# Patient Record
Sex: FEMALE | Race: WHITE | NOT HISPANIC OR LATINO | URBAN - METROPOLITAN AREA
[De-identification: names, ages, dates, MRNs, and addresses within clinical notes are randomized per-mention and may not be internally consistent; named-entity substitution may affect disease eponyms.]

---

## 2018-07-20 PROBLEM — Z00.00 ENCOUNTER FOR PREVENTIVE HEALTH EXAMINATION: Status: ACTIVE | Noted: 2018-07-20

## 2019-07-10 ENCOUNTER — INPATIENT (INPATIENT)
Facility: HOSPITAL | Age: 84
LOS: 1 days | Discharge: HOME | DRG: 537 | End: 2019-07-12
Attending: INTERNAL MEDICINE | Admitting: INTERNAL MEDICINE
Payer: MEDICARE

## 2019-07-10 VITALS
RESPIRATION RATE: 16 BRPM | DIASTOLIC BLOOD PRESSURE: 100 MMHG | TEMPERATURE: 98 F | SYSTOLIC BLOOD PRESSURE: 166 MMHG | WEIGHT: 110.01 LBS | HEART RATE: 87 BPM | OXYGEN SATURATION: 96 % | HEIGHT: 60 IN

## 2019-07-10 DIAGNOSIS — R26.81 UNSTEADINESS ON FEET: ICD-10-CM

## 2019-07-10 LAB
ALBUMIN SERPL ELPH-MCNC: 4.3 G/DL — SIGNIFICANT CHANGE UP (ref 3.3–5)
ALP SERPL-CCNC: 69 U/L — SIGNIFICANT CHANGE UP (ref 40–120)
ALT FLD-CCNC: 18 U/L — SIGNIFICANT CHANGE UP (ref 10–45)
ANION GAP SERPL CALC-SCNC: 14 MMOL/L — SIGNIFICANT CHANGE UP (ref 5–17)
APTT BLD: 36.9 SEC — HIGH (ref 27.5–36.3)
AST SERPL-CCNC: 24 U/L — SIGNIFICANT CHANGE UP (ref 10–40)
BILIRUB SERPL-MCNC: 0.4 MG/DL — SIGNIFICANT CHANGE UP (ref 0.2–1.2)
BUN SERPL-MCNC: 31 MG/DL — HIGH (ref 7–23)
CALCIUM SERPL-MCNC: 9.1 MG/DL — SIGNIFICANT CHANGE UP (ref 8.4–10.5)
CHLORIDE SERPL-SCNC: 99 MMOL/L — SIGNIFICANT CHANGE UP (ref 96–108)
CO2 SERPL-SCNC: 22 MMOL/L — SIGNIFICANT CHANGE UP (ref 22–31)
CREAT SERPL-MCNC: 0.86 MG/DL — SIGNIFICANT CHANGE UP (ref 0.5–1.3)
GLUCOSE SERPL-MCNC: 139 MG/DL — HIGH (ref 70–99)
HCT VFR BLD CALC: 37.3 % — SIGNIFICANT CHANGE UP (ref 34.5–45)
HGB BLD-MCNC: 12.8 G/DL — SIGNIFICANT CHANGE UP (ref 11.5–15.5)
INR BLD: 1.03 RATIO — SIGNIFICANT CHANGE UP (ref 0.88–1.16)
MCHC RBC-ENTMCNC: 31.2 PG — SIGNIFICANT CHANGE UP (ref 27–34)
MCHC RBC-ENTMCNC: 34.4 GM/DL — SIGNIFICANT CHANGE UP (ref 32–36)
MCV RBC AUTO: 90.9 FL — SIGNIFICANT CHANGE UP (ref 80–100)
PLATELET # BLD AUTO: 278 K/UL — SIGNIFICANT CHANGE UP (ref 150–400)
POTASSIUM SERPL-MCNC: 4.8 MMOL/L — SIGNIFICANT CHANGE UP (ref 3.5–5.3)
POTASSIUM SERPL-SCNC: 4.8 MMOL/L — SIGNIFICANT CHANGE UP (ref 3.5–5.3)
PROT SERPL-MCNC: 7.3 G/DL — SIGNIFICANT CHANGE UP (ref 6–8.3)
PROTHROM AB SERPL-ACNC: 11.8 SEC — SIGNIFICANT CHANGE UP (ref 10–12.9)
RBC # BLD: 4.1 M/UL — SIGNIFICANT CHANGE UP (ref 3.8–5.2)
RBC # FLD: 12.4 % — SIGNIFICANT CHANGE UP (ref 10.3–14.5)
SODIUM SERPL-SCNC: 135 MMOL/L — SIGNIFICANT CHANGE UP (ref 135–145)
WBC # BLD: 5.9 K/UL — SIGNIFICANT CHANGE UP (ref 3.8–10.5)
WBC # FLD AUTO: 5.9 K/UL — SIGNIFICANT CHANGE UP (ref 3.8–10.5)

## 2019-07-10 PROCEDURE — 73552 X-RAY EXAM OF FEMUR 2/>: CPT | Mod: 26,RT

## 2019-07-10 PROCEDURE — 99285 EMERGENCY DEPT VISIT HI MDM: CPT

## 2019-07-10 PROCEDURE — 73502 X-RAY EXAM HIP UNI 2-3 VIEWS: CPT | Mod: 26,RT

## 2019-07-10 RX ORDER — IBUPROFEN 200 MG
400 TABLET ORAL ONCE
Refills: 0 | Status: COMPLETED | OUTPATIENT
Start: 2019-07-10 | End: 2019-07-10

## 2019-07-10 RX ORDER — ACETAMINOPHEN 500 MG
975 TABLET ORAL ONCE
Refills: 0 | Status: COMPLETED | OUTPATIENT
Start: 2019-07-10 | End: 2019-07-10

## 2019-07-10 RX ADMIN — Medication 975 MILLIGRAM(S): at 20:45

## 2019-07-10 NOTE — ED PROVIDER NOTE - NS ED ROS FT
Constitutional: No fever or chills  Eyes: No visual changes, eye pain or redness  HEENT: No throat pain, ear pain, nasal pain. No nose bleeding.  CV: No chest pain or lower extremity edema  Resp: No SOB no cough  GI: No abd pain. No nausea or vomiting. No diarrhea. No constipation.   : No dysuria, hematuria.   MSK: see hpi  Skin: No rash  Neuro: No headache. No numbness or tingling. No weakness.

## 2019-07-10 NOTE — ED PROVIDER NOTE - OBJECTIVE STATEMENT
87 yo female with pmh of htn, presenting for right thigh pain s/p trip at home approx 2 hours ago. She states she was walking outside and tripped over a curb, stumbled and had onset of right thigh pain afterwards, was witnessed by  who called her an ambulance. She denies numbness, tingling, parasthesias, denies hip or knee pain, denies falling, hitting head or loc. 87 yo female with pmh of htn, TAV c/b aortic dissection presenting for right thigh pain s/p trip at home approx 2 hours ago. She states she was walking outside and tripped over a curb, stumbled and had onset of right thigh pain afterwards, was witnessed by  who called her an ambulance. She denies numbness, tingling, parasthesias, denies hip or knee pain, denies falling, hitting head or loc. 87 yo female with pmh of htn, TAV c/b aortic dissection (chronic) presenting for right thigh pain s/p trip at home approx 2 hours ago. She states she was walking outside and tripped over a curb, stumbled and had onset of right thigh pain afterwards, was witnessed by  who called her an ambulance. She denies numbness, tingling, parasthesias, denies hip or knee pain, denies falling, hitting head or loc. 89 yo female with pmh of htn, TAVR c/b aortic dissection (chronic) presenting for right thigh pain s/p trip at home approx 2 hours ago. She states she was walking outside and tripped over a curb, stumbled and had onset of right thigh pain afterwards, was witnessed by  who called her an ambulance. She denies numbness, tingling, parasthesias, denies hip or knee pain, denies falling, hitting head or loc.

## 2019-07-10 NOTE — ED ADULT NURSE NOTE - OBJECTIVE STATEMENT
88 year old female A&OX4 presents with a right thigh injury this evening after walking on the sidewalk. Patient fell and was unable to ambulate after injury. No swelling, discoloration, deformities noted.

## 2019-07-10 NOTE — ED PROVIDER NOTE - CLINICAL SUMMARY MEDICAL DECISION MAKING FREE TEXT BOX
Mechanical fall, R thigh/hamstring pain, no obvious deformities. R thigh/hamstring pain, after trip/stumble, no fall to ground, no deformities, no neuro deficits, well appearing, nontoxic, vss, aaox3, no external signs of trauma.  IR/ER without limitation or pain.  No other complaints.  Antalgic gait in ED 2/2 pain.  As per family lives alone, has been having recent memory problems/forgetfulness but has been declining hha/snf.  No reported recent falls.  XRs, pain Rx, reassess.  See progress note.  --BMM R thigh/hamstring pain, after trip/stumble, no fall to ground, no deformities, no neuro deficits, well appearing, nontoxic, vss, aaox3, no external signs of trauma.  IR/ER without limitation or pain.  No other complaints.  Antalgic gait in ED 2/2 pain.  As per family lives alone, has been having recent memory problems/forgetfulness but has been declining hha/snf.  No reported recent falls.  XRs, pain Rx, reassess.  See progress notes.  --BMM

## 2019-07-10 NOTE — ED PROVIDER NOTE - PROGRESS NOTE DETAILS
Spoke with pts son He [phone] to discuss care, discussed that pt lives home alone, he states him and his siblings have had concern over pts short term memory since january, Patient becoming agitated, states she has not received information re her xrays (discussed XR findings at length with patient twice).  Stated she has been here 8+ hours when she had been in ED approx 2.5 hrs.  Remains AAOx3 and demonstrates capacity.  Attempted ambulation with her, she was unsteady on her feet and was having difficulty ambulating without pain.  Represents significant fall risk.  At this time she is refusing all PO/IV pain medications.  I explained to her at length that she is an unsafe discharge given her risk for falling and lack of help at home.  No medical indication for admission at this time; attempting to call son to see if he can bring pt home for monitoring, otherwise will require assessment for capacity given memory loss and mild confusion (baseline as per son).  Assuming d/c will have SW numbers available for family.  --BUCK Has capacity, declining CT head, understand r/b/a, states "even if there was bleeding I wouldn't want anythign done".  Son willing to come to ED to  patient and is able to obs her overnight.  Will plan to D/C c cane.  --UBCK Has capacity, declining CT head, understand r/b/a, states "even if there was bleeding I wouldn't want anything done".  Son willing to come to ED to  patient and is able to obs her overnight.  Will plan to D/C c cane.  --RAGINIM No evidence of fx on XR and patient is able to ambulate with assistance.  Aside from occasional repetition of questions (had to inform her twice of XR results), she exhibits capacity and remains AAOx3.  Will s/w pt's son to try to have her observed/assisted overnight as no medical indication for discharge.  Declining CT head.  --BMM Ambulating with a cane in ED.  Son at bedside, able to stay with her throughout the night.  Radiology prelim shows no fx and exam does not seem c/w hip fx on re-examination (+TTP at posterolateral mid-thigh and with resisted knee flexion, no groin, hip tenderness to palpation, no pain c IR or ER, no pain c Ambulating with a cane in ED.  Son at bedside, able to stay with her throughout the night.  Radiology prelim shows no fx and exam does not seem c/w hip fx on re-examination (+TTP at posterolateral mid-thigh and with resisted knee flexion, no groin, hip tenderness to palpation, no pain c IR or ER. Patient had episode of weakness/diaphoresis and vomiting after standing, seems c/w orthostatic hypotension (100s/60s on exam, son states typically hypertensive).  States R thigh pain somewhat worsened.  Given this, I recommended admission for further w/u (CT head/pelvis), fluids, PT.  Patient is amenable to this plan.  Medicine admitting team already aware of her from prior plan to admit, will notify to update them on this change in plan.  Remains AAOx3, no confusion/lethargy/AMS as per son.  S/O to Dr. Smith pending CTs.  --BMM No evidence of fx on XR and patient is able to ambulate, though requires assistance at all times.  Aside from occasional repetition of questions (had to inform her twice of XR results), she exhibits capacity and remains AAOx3.  Will s/w pt's son to try to have her observed/assisted overnight as no medical indication for discharge.  Declining CT head.  --BMM Ambulating with a cane in ED.  Son at bedside, able to stay with her throughout the night.  Radiology prelim shows no fx and exam does not seem c/w hip fx on re-examination (+TTP at posterolateral mid-thigh and with resisted knee flexion, no groin, hip tenderness to palpation, no pain c IR or ER.  Had attempted to give pt dinner but she refused, stating she did not like any of the meals available in the ED.  --BMM

## 2019-07-10 NOTE — ED PROVIDER NOTE - CARE PLAN
Principal Discharge DX:	Unsteady gait Principal Discharge DX:	Hamstring strain, right, initial encounter Principal Discharge DX:	Hamstring strain, right, initial encounter  Secondary Diagnosis:	Nausea and vomiting  Secondary Diagnosis:	Orthostasis

## 2019-07-10 NOTE — ED PROVIDER NOTE - NSFOLLOWUPINSTRUCTIONS_ED_ALL_ED_FT
Follow up with your primary doctor TOMORROW  Someone from the [facility] department of social work will contact you with options for assistance  Motrin 400 mg every 8 hours for pain  WALK WITH A CANE WHILE YOU ARE IN PAIN!  This will help reduce the chance you fall  Return for vomiting, confusion, worsening pain, or ANY other concerns

## 2019-07-10 NOTE — ED PROVIDER NOTE - PHYSICAL EXAMINATION
A&Ox3, NAD. NCAT. PERRL, EOMI. Neck supple, no LAD. Lungs CTAB. +S1S2, RRR, +systolic murmur, no r/g. Abd soft, NT/ND, +BS, no rebound or guarding. Extremities: cap refill <2, pulses in distal extremities 4+, no edema. Skin without rash. CN II-XII intact. Strength 5/5 UE/LE. +tenderness to posterior thigh and medial thigh to palpation, Sensations intact throughout.

## 2019-07-11 DIAGNOSIS — I10 ESSENTIAL (PRIMARY) HYPERTENSION: ICD-10-CM

## 2019-07-11 DIAGNOSIS — M79.651 PAIN IN RIGHT THIGH: ICD-10-CM

## 2019-07-11 DIAGNOSIS — R53.81 OTHER MALAISE: ICD-10-CM

## 2019-07-11 DIAGNOSIS — Z95.2 PRESENCE OF PROSTHETIC HEART VALVE: ICD-10-CM

## 2019-07-11 DIAGNOSIS — I95.1 ORTHOSTATIC HYPOTENSION: ICD-10-CM

## 2019-07-11 DIAGNOSIS — D64.9 ANEMIA, UNSPECIFIED: ICD-10-CM

## 2019-07-11 DIAGNOSIS — Z79.899 OTHER LONG TERM (CURRENT) DRUG THERAPY: ICD-10-CM

## 2019-07-11 DIAGNOSIS — Z29.9 ENCOUNTER FOR PROPHYLACTIC MEASURES, UNSPECIFIED: ICD-10-CM

## 2019-07-11 DIAGNOSIS — S76.311A STRAIN OF MUSCLE, FASCIA AND TENDON OF THE POSTERIOR MUSCLE GROUP AT THIGH LEVEL, RIGHT THIGH, INITIAL ENCOUNTER: ICD-10-CM

## 2019-07-11 DIAGNOSIS — Z90.49 ACQUIRED ABSENCE OF OTHER SPECIFIED PARTS OF DIGESTIVE TRACT: Chronic | ICD-10-CM

## 2019-07-11 LAB
ALBUMIN SERPL ELPH-MCNC: 3.9 G/DL — SIGNIFICANT CHANGE UP (ref 3.3–5)
ALP SERPL-CCNC: 53 U/L — SIGNIFICANT CHANGE UP (ref 40–120)
ALT FLD-CCNC: 13 U/L — SIGNIFICANT CHANGE UP (ref 10–45)
ANION GAP SERPL CALC-SCNC: 12 MMOL/L — SIGNIFICANT CHANGE UP (ref 5–17)
APPEARANCE UR: CLEAR — SIGNIFICANT CHANGE UP
AST SERPL-CCNC: 15 U/L — SIGNIFICANT CHANGE UP (ref 10–40)
BASOPHILS # BLD AUTO: 0.02 K/UL — SIGNIFICANT CHANGE UP (ref 0–0.2)
BASOPHILS NFR BLD AUTO: 0.2 % — SIGNIFICANT CHANGE UP (ref 0–2)
BILIRUB SERPL-MCNC: 0.5 MG/DL — SIGNIFICANT CHANGE UP (ref 0.2–1.2)
BILIRUB UR-MCNC: NEGATIVE — SIGNIFICANT CHANGE UP
BUN SERPL-MCNC: 30 MG/DL — HIGH (ref 7–23)
CALCIUM SERPL-MCNC: 9.1 MG/DL — SIGNIFICANT CHANGE UP (ref 8.4–10.5)
CHLORIDE SERPL-SCNC: 99 MMOL/L — SIGNIFICANT CHANGE UP (ref 96–108)
CO2 SERPL-SCNC: 23 MMOL/L — SIGNIFICANT CHANGE UP (ref 22–31)
COLOR SPEC: SIGNIFICANT CHANGE UP
CREAT SERPL-MCNC: 0.91 MG/DL — SIGNIFICANT CHANGE UP (ref 0.5–1.3)
DIFF PNL FLD: NEGATIVE — SIGNIFICANT CHANGE UP
EOSINOPHIL # BLD AUTO: 0.06 K/UL — SIGNIFICANT CHANGE UP (ref 0–0.5)
EOSINOPHIL NFR BLD AUTO: 0.7 % — SIGNIFICANT CHANGE UP (ref 0–6)
GLUCOSE BLDC GLUCOMTR-MCNC: 160 MG/DL — HIGH (ref 70–99)
GLUCOSE SERPL-MCNC: 128 MG/DL — HIGH (ref 70–99)
GLUCOSE UR QL: NEGATIVE — SIGNIFICANT CHANGE UP
HCT VFR BLD CALC: 30.6 % — LOW (ref 34.5–45)
HCT VFR BLD CALC: 30.8 % — LOW (ref 34.5–45)
HGB BLD-MCNC: 10.2 G/DL — LOW (ref 11.5–15.5)
HGB BLD-MCNC: 9.8 G/DL — LOW (ref 11.5–15.5)
IMM GRANULOCYTES NFR BLD AUTO: 0.4 % — SIGNIFICANT CHANGE UP (ref 0–1.5)
KETONES UR-MCNC: NEGATIVE — SIGNIFICANT CHANGE UP
LEUKOCYTE ESTERASE UR-ACNC: NEGATIVE — SIGNIFICANT CHANGE UP
LYMPHOCYTES # BLD AUTO: 1.11 K/UL — SIGNIFICANT CHANGE UP (ref 1–3.3)
LYMPHOCYTES # BLD AUTO: 13.2 % — SIGNIFICANT CHANGE UP (ref 13–44)
MAGNESIUM SERPL-MCNC: 2.2 MG/DL — SIGNIFICANT CHANGE UP (ref 1.6–2.6)
MCHC RBC-ENTMCNC: 29 PG — SIGNIFICANT CHANGE UP (ref 27–34)
MCHC RBC-ENTMCNC: 30.4 PG — SIGNIFICANT CHANGE UP (ref 27–34)
MCHC RBC-ENTMCNC: 31.8 GM/DL — LOW (ref 32–36)
MCHC RBC-ENTMCNC: 33.3 GM/DL — SIGNIFICANT CHANGE UP (ref 32–36)
MCV RBC AUTO: 91.1 FL — SIGNIFICANT CHANGE UP (ref 80–100)
MCV RBC AUTO: 91.2 FL — SIGNIFICANT CHANGE UP (ref 80–100)
MONOCYTES # BLD AUTO: 1.02 K/UL — HIGH (ref 0–0.9)
MONOCYTES NFR BLD AUTO: 12.1 % — SIGNIFICANT CHANGE UP (ref 2–14)
NEUTROPHILS # BLD AUTO: 6.19 K/UL — SIGNIFICANT CHANGE UP (ref 1.8–7.4)
NEUTROPHILS NFR BLD AUTO: 73.4 % — SIGNIFICANT CHANGE UP (ref 43–77)
NITRITE UR-MCNC: NEGATIVE — SIGNIFICANT CHANGE UP
PH UR: 6 — SIGNIFICANT CHANGE UP (ref 5–8)
PHOSPHATE SERPL-MCNC: 4.3 MG/DL — SIGNIFICANT CHANGE UP (ref 2.5–4.5)
PLATELET # BLD AUTO: 228 K/UL — SIGNIFICANT CHANGE UP (ref 150–400)
PLATELET # BLD AUTO: 231 K/UL — SIGNIFICANT CHANGE UP (ref 150–400)
POTASSIUM SERPL-MCNC: 4.3 MMOL/L — SIGNIFICANT CHANGE UP (ref 3.5–5.3)
POTASSIUM SERPL-SCNC: 4.3 MMOL/L — SIGNIFICANT CHANGE UP (ref 3.5–5.3)
PROT SERPL-MCNC: 6.2 G/DL — SIGNIFICANT CHANGE UP (ref 6–8.3)
PROT UR-MCNC: NEGATIVE — SIGNIFICANT CHANGE UP
RBC # BLD: 3.36 M/UL — LOW (ref 3.8–5.2)
RBC # BLD: 3.38 M/UL — LOW (ref 3.8–5.2)
RBC # FLD: 12.5 % — SIGNIFICANT CHANGE UP (ref 10.3–14.5)
RBC # FLD: 13.4 % — SIGNIFICANT CHANGE UP (ref 10.3–14.5)
SODIUM SERPL-SCNC: 134 MMOL/L — LOW (ref 135–145)
SP GR SPEC: 1.01 — SIGNIFICANT CHANGE UP (ref 1.01–1.02)
UROBILINOGEN FLD QL: NEGATIVE — SIGNIFICANT CHANGE UP
WBC # BLD: 7.6 K/UL — SIGNIFICANT CHANGE UP (ref 3.8–10.5)
WBC # BLD: 8.43 K/UL — SIGNIFICANT CHANGE UP (ref 3.8–10.5)
WBC # FLD AUTO: 7.6 K/UL — SIGNIFICANT CHANGE UP (ref 3.8–10.5)
WBC # FLD AUTO: 8.43 K/UL — SIGNIFICANT CHANGE UP (ref 3.8–10.5)

## 2019-07-11 PROCEDURE — 99223 1ST HOSP IP/OBS HIGH 75: CPT

## 2019-07-11 PROCEDURE — 72192 CT PELVIS W/O DYE: CPT | Mod: 26

## 2019-07-11 PROCEDURE — 12345: CPT | Mod: NC

## 2019-07-11 PROCEDURE — 76377 3D RENDER W/INTRP POSTPROCES: CPT | Mod: 26

## 2019-07-11 PROCEDURE — 70450 CT HEAD/BRAIN W/O DYE: CPT | Mod: 26

## 2019-07-11 RX ORDER — LABETALOL HCL 100 MG
0.5 TABLET ORAL
Qty: 0 | Refills: 0 | DISCHARGE

## 2019-07-11 RX ORDER — LABETALOL HCL 100 MG
1 TABLET ORAL
Qty: 0 | Refills: 0 | DISCHARGE

## 2019-07-11 RX ORDER — ACETAMINOPHEN 500 MG
650 TABLET ORAL EVERY 6 HOURS
Refills: 0 | Status: DISCONTINUED | OUTPATIENT
Start: 2019-07-11 | End: 2019-07-12

## 2019-07-11 RX ORDER — ONDANSETRON 8 MG/1
4 TABLET, FILM COATED ORAL ONCE
Refills: 0 | Status: COMPLETED | OUTPATIENT
Start: 2019-07-11 | End: 2019-07-11

## 2019-07-11 RX ORDER — ASPIRIN/CALCIUM CARB/MAGNESIUM 324 MG
1 TABLET ORAL
Qty: 0 | Refills: 0 | DISCHARGE

## 2019-07-11 RX ORDER — SODIUM CHLORIDE 9 MG/ML
500 INJECTION INTRAMUSCULAR; INTRAVENOUS; SUBCUTANEOUS ONCE
Refills: 0 | Status: COMPLETED | OUTPATIENT
Start: 2019-07-11 | End: 2019-07-11

## 2019-07-11 RX ADMIN — ONDANSETRON 4 MILLIGRAM(S): 8 TABLET, FILM COATED ORAL at 00:49

## 2019-07-11 RX ADMIN — SODIUM CHLORIDE 500 MILLILITER(S): 9 INJECTION INTRAMUSCULAR; INTRAVENOUS; SUBCUTANEOUS at 00:49

## 2019-07-11 NOTE — PROGRESS NOTE ADULT - PROBLEM SELECTOR PLAN 3
Initally reported to be orthostatic however on repeat measurement patient's orthostasis has resolved  - continue monitoring BP  - fall precautions  - enhanced supervision

## 2019-07-11 NOTE — H&P ADULT - NSHPSOCIALHISTORY_GEN_ALL_CORE
Social History:    Marital Status:  (   )    (   ) Single    (   )    ( x )   Occupation: not working  Lives with: (x  ) alone  (  ) children   (  ) spouse   (  ) parents  (  ) other    Substance Use (street drugs): (x  ) never used  (  ) other:  Tobacco Usage:  (   ) never smoked   (  x ) former smoker   (   ) current smoker  (     ) pack year  (        ) last cigarette date  Alcohol Usage: seldom

## 2019-07-11 NOTE — PHYSICAL THERAPY INITIAL EVALUATION ADULT - PRECAUTIONS/LIMITATIONS, REHAB EVAL
Was witnessed by a , who brought her a chair, and pt was unsure if she injured herself, so she was transported to ER via EMS. In ER, pt was frequently refusing meds, imaging tests, and asking to go home.  She did note that when she moves in "certain positions" the back of her R leg aches. Attempt was made to ambulate pt, but was aborted as pt developed nausea; she denies developing dizziness/lightheadedness or passing out. XR hip/R femur negative for definitive acute fx/dislocation. CT AP:  Hematoma inferior to the right ischial tuberosity within and about the right proximal hamstring musculotendinous units with suspected  associated tearing of the right hamstring tendons.

## 2019-07-11 NOTE — H&P ADULT - ASSESSMENT
88 F PMH dementia, TAVR 1/2019 c/b aortic dissection, HTN, appendectomy, p/w mechanical fall, found with ongoing debility and persistent fall risk in setting of progressive dementia.

## 2019-07-11 NOTE — H&P ADULT - HISTORY OF PRESENT ILLNESS
88 F PMH dementia, TAVR 1/2019 c/b aortic dissection, HTN, appendectomy, p/w mechanical fall. Pt was walking and "stumbled" by hitting her feet on the curb.  She vehemently denies that she actually fell or hit her head, denies syncope, denies prodrome, denies dizziness/lightheadness. Was witnessed by a , who brought her a chair, and pt was unsure if she injured herself, so she was transported to ER via EMS. In ER, pt was frequently refusing meds, imaging tests, and asking to go home.  She did note that when she moves in "certain positions" the back of her R leg aches. Attempt was made to ambulate pt, but was aborted as pt developed nausea; she denies developing dizziness/lightheadedness or passing out. She attribute these sx to not having eaten anything since lunch.  Son provides additional collateral: pt has declined since getting her TAVR 1/2019, with progressive dementia issues (short term memory, agitation) but preserved long term memory; he and his sister (physician) feel pt would require some aide at home but pt has vehemently refused.     VS: 98.7, 93, 100/63, 16, 97% Ra. Labs: cbc unrevealing, coags unrevealing, cmp unrevealing. XR hip/R femur negative for definitive acute fx/dislocation. In Er pt received zofran, tylenol, ibuprofen prior to medicine team involvement. 88 F PMH dementia, TAVR 1/2019 c/b aortic dissection, HTN, appendectomy, p/w mechanical fall. Pt was walking and "stumbled" by hitting her feet on the curb.  She vehemently denies that she actually fell or hit her head, denies syncope, denies prodrome, denies dizziness/lightheadness. Was witnessed by a , who brought her a chair, and pt was unsure if she injured herself, so she was transported to ER via EMS. In ER, pt was frequently refusing meds, imaging tests, and asking to go home.  She did note that when she moves in "certain positions" the back of her R leg aches. Attempt was made to ambulate pt, but was aborted as pt developed nausea; she denies developing dizziness/lightheadedness or passing out. She attribute these sx to not having eaten anything since lunch.  Pt denies cp/sob/f/c/v/d/dysuria/cough/uri sx/orthopnea/LE edema/HA/visual changes/focal neuro deficits.  Son provides additional collateral: pt has declined since getting her TAVR 1/2019, with progressive dementia issues (short term memory, agitation) but preserved long term memory; he and his sister (physician) feel pt would require some aide at home but pt has vehemently refused.     VS: 98.7, 93, 100/63, 16, 97% Ra. Labs: cbc unrevealing, coags unrevealing, cmp unrevealing. XR hip/R femur negative for definitive acute fx/dislocation. In Er pt received zofran, tylenol, ibuprofen prior to medicine team involvement.

## 2019-07-11 NOTE — CONSULT NOTE ADULT - SUBJECTIVE AND OBJECTIVE BOX
Orthopaedic Surgery Consult Note    Patient is a 88y old  Female who presents with a chief complaint of mechanical fall, R thigh pain. (11 Jul 2019 13:56)    HPI:  88 F PMH dementia, TAVR 1/2019 c/b aortic dissection, HTN, appendectomy, p/w mechanical fall. Patient was ambulating when she stumbled and almost fell. She denies actually falling or hitting her head or injuring any extremity. She denies syncope, denies prodrome, denies dizziness/lightheadness. She mostly complains of Right posterior buttock and thigh pain with movement. Pain started immediately after near fall. Orthopedics was consulted after CT pelvis demonstrated R hamstring avulsion.           PAST MEDICAL & SURGICAL HISTORY:  HTN (hypertension)  S/P TAVR (transcatheter aortic valve replacement)  History of appendectomy    [] No significant past history as reviewed with the patient and family    FAMILY HISTORY:  No pertinent family history in first degree relatives    [] Family history not pertinent as reviewed with the patient and family    SOCIAL HISTORY:    MEDICATIONS  (STANDING):    MEDICATIONS  (PRN):  acetaminophen   Tablet .. 650 milliGRAM(s) Oral every 6 hours PRN Temp greater or equal to 38C (100.4F), Mild Pain (1 - 3), Moderate Pain (4 - 6), Severe Pain (7 - 10)    Allergies    No Known Allergies    Intolerances        REVIEW OF SYSTEMS  [x] All review of systems negative except for those marked.  Systemic:	[] Fever		[] Chills		[] Night sweats		[] Fatigue	[] Other  [] Cardiovascular:  [] Pulmonary:  [] Renal/Urologic:  [] Gastrointestinal:  [] Metabolic:  [] Neurologic:  [] Hematologic:  [] ENT:  [] Ophthalmologic:  [] Musculoskeletal: see HPI    Vital Signs Last 24 Hrs  T(C): 36.7 (11 Jul 2019 15:09), Max: 37.1 (11 Jul 2019 00:23)  T(F): 98 (11 Jul 2019 15:09), Max: 98.7 (11 Jul 2019 00:23)  HR: 86 (11 Jul 2019 15:09) (83 - 95)  BP: 105/60 (11 Jul 2019 15:09) (100/63 - 166/100)  BP(mean): --  RR: 16 (11 Jul 2019 15:09) (16 - 18)  SpO2: 95% (11 Jul 2019 15:09) (95% - 98%)      PHYSICAL EXAM:  Exam:  Gen: NAD  RLE:  Mild pain with AROM and manual muscle testing in R hip  No TTP about hip  Neg LR and SLR  Motor: 5/5 EHL/FHL/TA/Gastrocnemius  Sensory: SILT DP/SP/S/S/T nerve distributions  Vascular: 2+ Dorsalis Pedis pulse                            9.8    8.43  )-----------( 231      ( 11 Jul 2019 09:36 )             30.8     07-11    134<L>  |  99  |  30<H>  ----------------------------<  128<H>  4.3   |  23  |  0.91    Ca    9.1      11 Jul 2019 07:16  Phos  4.3     07-11  Mg     2.2     07-11    TPro  6.2  /  Alb  3.9  /  TBili  0.5  /  DBili  x   /  AST  15  /  ALT  13  /  AlkPhos  53  07-11    PT/INR - ( 10 Jul 2019 21:32 )   PT: 11.8 sec;   INR: 1.03 ratio         PTT - ( 10 Jul 2019 21:32 )  PTT:36.9 sec      IMAGING STUDIES:  R hip XR;  -no acute fractures or dislocations    CT bony pelvis:    IMPRESSION: Hematoma inferior to the right ischial tuberosity within and   about the right proximal hamstring musculotendinous units with suspected   associated tearing of the right hamstring tendons. MRI can be performed   to further evaluate the status of the tendons and degree of retraction if   indicated.    8.0 x 6.7 x 6.8 cm cystic mass in the right adnexa. Ultrasound is   recommended to further evaluate.        88y Female s/p possible fall with suspected R hamstring tendon tear.    -pain control  -PT  -WBAT  -OOB  -DVT ppx  -dispo plan  -No acute orthopedic intervention at this time Orthopaedic Surgery Consult Note    Patient is a 88y old  Female who presents with a chief complaint of mechanical fall, R thigh pain. (11 Jul 2019 13:56)    HPI:  88 F PMH dementia, TAVR 1/2019 c/b aortic dissection, HTN, appendectomy, p/w mechanical fall. Patient was ambulating when she stumbled and almost fell. She denies actually falling or hitting her head or injuring any extremity. She denies syncope, denies prodrome, denies dizziness/lightheadness. She mostly complains of Right posterior buttock and thigh pain with movement. Pain started immediately after near fall. Orthopedics was consulted after CT pelvis demonstrated R hamstring avulsion.           PAST MEDICAL & SURGICAL HISTORY:  HTN (hypertension)  S/P TAVR (transcatheter aortic valve replacement)  History of appendectomy    [] No significant past history as reviewed with the patient and family    FAMILY HISTORY:  No pertinent family history in first degree relatives    [] Family history not pertinent as reviewed with the patient and family    SOCIAL HISTORY:    MEDICATIONS  (STANDING):    MEDICATIONS  (PRN):  acetaminophen   Tablet .. 650 milliGRAM(s) Oral every 6 hours PRN Temp greater or equal to 38C (100.4F), Mild Pain (1 - 3), Moderate Pain (4 - 6), Severe Pain (7 - 10)    Allergies    No Known Allergies    Intolerances        REVIEW OF SYSTEMS  [x] All review of systems negative except for those marked.  Systemic:	[] Fever		[] Chills		[] Night sweats		[] Fatigue	[] Other  [] Cardiovascular:  [] Pulmonary:  [] Renal/Urologic:  [] Gastrointestinal:  [] Metabolic:  [] Neurologic:  [] Hematologic:  [] ENT:  [] Ophthalmologic:  [] Musculoskeletal: see HPI    Vital Signs Last 24 Hrs  T(C): 36.7 (11 Jul 2019 15:09), Max: 37.1 (11 Jul 2019 00:23)  T(F): 98 (11 Jul 2019 15:09), Max: 98.7 (11 Jul 2019 00:23)  HR: 86 (11 Jul 2019 15:09) (83 - 95)  BP: 105/60 (11 Jul 2019 15:09) (100/63 - 166/100)  BP(mean): --  RR: 16 (11 Jul 2019 15:09) (16 - 18)  SpO2: 95% (11 Jul 2019 15:09) (95% - 98%)      PHYSICAL EXAM:  Exam:  Gen: NAD  RLE:  Mild pain with AROM and manual muscle testing in R hip  No TTP about hip  Neg LR and SLR  Motor: 5/5 EHL/FHL/TA/Gastrocnemius  Sensory: SILT DP/SP/S/S/T nerve distributions  Vascular: 2+ Dorsalis Pedis pulse                            9.8    8.43  )-----------( 231      ( 11 Jul 2019 09:36 )             30.8     07-11    134<L>  |  99  |  30<H>  ----------------------------<  128<H>  4.3   |  23  |  0.91    Ca    9.1      11 Jul 2019 07:16  Phos  4.3     07-11  Mg     2.2     07-11    TPro  6.2  /  Alb  3.9  /  TBili  0.5  /  DBili  x   /  AST  15  /  ALT  13  /  AlkPhos  53  07-11    PT/INR - ( 10 Jul 2019 21:32 )   PT: 11.8 sec;   INR: 1.03 ratio         PTT - ( 10 Jul 2019 21:32 )  PTT:36.9 sec      IMAGING STUDIES:  R hip XR;  -no acute fractures or dislocations    CT bony pelvis:    IMPRESSION: Hematoma inferior to the right ischial tuberosity within and   about the right proximal hamstring musculotendinous units with suspected   associated tearing of the right hamstring tendons. MRI can be performed   to further evaluate the status of the tendons and degree of retraction if   indicated.    8.0 x 6.7 x 6.8 cm cystic mass in the right adnexa. Ultrasound is   recommended to further evaluate.        88y Female s/p possible fall with suspected R hamstring tendon tear.    -pain control  -Rest, Ice, compression  -PT  -WBAT  -OOB  -DVT ppx  -dispo plan  -No acute orthopedic intervention at this time

## 2019-07-11 NOTE — PROGRESS NOTE ADULT - PROBLEM SELECTOR PLAN 4
complicated by descending aortic dissection, stable. On aspirin  - will hold aspirin given hematoma, f/u surgery recs re: when to resume

## 2019-07-11 NOTE — PROGRESS NOTE ADULT - PROBLEM SELECTOR PLAN 1
With associated hematoma and suspect tendon tear. Slight drop in Hb could be dilutional but also concerning for active bleeding.  - surgery consult- if patient not a surgical candidate will not pursue further imaging.  - PT recommending home with home PT but patient now with increasing pain, would recommend repeat evaluation tomorrow  - tylenol prn pain  - enhanced supervision

## 2019-07-11 NOTE — H&P ADULT - NSHPPHYSICALEXAM_GEN_ALL_CORE
PHYSICAL EXAM:  GENERAL: NAD, well-groomed, well-developed  HEAD:  Atraumatic, Normocephalic  EYES: EOMI, PERRLA, conjunctiva and sclera clear  ENMT: No tonsillar erythema, exudates, or enlargement; Moist mucous membranes, Good dentition, No lesions  NECK: Supple, No JVD, Normal thyroid  CHEST/LUNG: Clear to percussion bilaterally; No rales, rhonchi, wheezing, or rubs  HEART: Regular rate and rhythm; No murmurs, rubs, or gallops  ABDOMEN: Soft, Nontender, Nondistended; Bowel sounds present  EXTREMITIES:  2+ Peripheral Pulses, No clubbing, cyanosis, or edema  LYMPH: No lymphadenopathy noted  SKIN: No rashes or lesions  NERVOUS SYSTEM:  Alert & Oriented X3, Good concentration; Motor Strength 5/5 B/L upper and lower extremities; DTRs 2+ intact and symmetric PHYSICAL EXAM:  GENERAL: NAD, well-groomed, well-developed, elderly female.  agitated at times.   HEAD:  Atraumatic, Normocephalic  EYES: EOMI, PERRLA, conjunctiva and sclera clear  ENMT: No tonsillar erythema, exudates, or enlargement; dry mucous membranes, Good dentition, No lesions  NECK: Supple, No JVD, Normal thyroid  CHEST/LUNG: Clear to percussion bilaterally; No rales, rhonchi, wheezing, or rubs no resp distress or accessory muscle usage talking in full sentences  HEART: Regular rate and rhythm; No murmurs, rubs, or gallops, no sig LE edema.   ABDOMEN: Soft, Nontender, Nondistended; Bowel sounds present, no rebound/guarding  EXTREMITIES:  2+ Peripheral Pulses, No clubbing, cyanosis. +TTP over posterior thigh and hamstring. no bony TTP over R hip. No pain with internal/external rotation.   LYMPH: No lymphadenopathy noted, no lymphangitis  SKIN: No rashes or lesions,no petechiae  NERVOUS SYSTEM:  Alert & Oriented X3, Good concentration; Motor Strength 5/5 B/L upper and lower extremities

## 2019-07-11 NOTE — H&P ADULT - ATTENDING COMMENTS
Care to be assumed by Dr. Fortune at 8 am.  This patient was assigned to me by the hospitalist in charge; my involvement in this case has consisted of the initial history, physical, chart review, and management plan.  This patient was previously unknown to me.

## 2019-07-11 NOTE — H&P ADULT - NSHPLABSRESULTS_GEN_ALL_CORE
Labs personally reviewed : cbc unrevealing, coags unrevealing, cmp unrevealing.   Imaging personally reviewed XR hip/R femur negative for definitive acute fx/dislocation.  EKG personally reviewed

## 2019-07-11 NOTE — CHART NOTE - NSCHARTNOTEFT_GEN_A_CORE
COVERING ED :  Social Work referred to patient for "eval for HHA vs. VNS." Social Work reviewed patient's chart. Patient is a 88 y.o. female with PMH: TAVR, aortic dissection, HTN, APPY. LMSW discussed consult with ONESIMO Geiger and EVITA Dorantes. CM to complete assessment and f/u with home care needs.  Social Work to remain available, and continue to collaborate with interdisciplinary team.

## 2019-07-11 NOTE — H&P ADULT - PROBLEM SELECTOR PLAN 3
-sbp dropped from 160 to about 100 and pt became symptomatic with nausea upon standing  -liberal diet as tolerated  -fall precautions  -s/p 500 cc bolus  -temp holding home bp meds (pt states she is on labetalol, need to clarify dose prior to resuming)

## 2019-07-11 NOTE — PHYSICAL THERAPY INITIAL EVALUATION ADULT - PERTINENT HX OF CURRENT PROBLEM, REHAB EVAL
Pt is a 89 y/o female presenting to [facility] on 7/10/19 PMH dementia, TAVR 1/2019 c/b aortic dissection, HTN, appendectomy, p/w mechanical fall. Pt was walking and "stumbled" by hitting her feet on the curb.  She vehemently denies that she actually fell or hit her head, denies syncope, denies prodrome, denies dizziness/lightheadness.

## 2019-07-11 NOTE — H&P ADULT - PROBLEM SELECTOR PLAN 5
-pt states she is on aspirin and labetalol but does not know dose  -daughter is a physician and will bring in dosages and full med list in am  -for now will hold aspirin given r/o ich with CTH  -temp holding antihypertensives given orthostasis.

## 2019-07-11 NOTE — PROGRESS NOTE ADULT - PROBLEM SELECTOR PLAN 2
Hb drop from 12.8 to 9.8. Partly dilutional from fluid administration but also concern for active hemorrhage into hematoma given patient is on aspirin.  - hold aspirin  - q8 hour CBC- transfuse to hb< 7

## 2019-07-11 NOTE — H&P ADULT - PROBLEM SELECTOR PLAN 2
-falls risk with progressive dementia  -fall precautions  -f/u pending CT hip as above  -f/u CTH to eval for nontraumatic ICH given full dose aspirin  -SW eval given fall risk and progressive dementia

## 2019-07-11 NOTE — H&P ADULT - PROBLEM SELECTOR PLAN 1
-s/p mechanical fall, -s/p mechanical fall  -initial trauma XR imaging reviewed; no acute definitive fx of R hip/femur. ?bony lucency  -exam not c/w obvious hip fracture; no unequal legs, internal rotation, or pain with movement or bony ttp.   -f/u pending CT hip to eval for prior seen lucency and to eval for obv soft tissue/hematoma injuries.   -pain control with tylenol prn  -PT eval  -fall precautions

## 2019-07-12 ENCOUNTER — TRANSCRIPTION ENCOUNTER (OUTPATIENT)
Age: 84
End: 2019-07-12

## 2019-07-12 VITALS
HEART RATE: 79 BPM | RESPIRATION RATE: 17 BRPM | SYSTOLIC BLOOD PRESSURE: 106 MMHG | TEMPERATURE: 99 F | OXYGEN SATURATION: 95 % | DIASTOLIC BLOOD PRESSURE: 58 MMHG

## 2019-07-12 LAB
ANION GAP SERPL CALC-SCNC: 11 MMOL/L — SIGNIFICANT CHANGE UP (ref 5–17)
BLD GP AB SCN SERPL QL: NEGATIVE — SIGNIFICANT CHANGE UP
BUN SERPL-MCNC: 31 MG/DL — HIGH (ref 7–23)
CALCIUM SERPL-MCNC: 8.7 MG/DL — SIGNIFICANT CHANGE UP (ref 8.4–10.5)
CHLORIDE SERPL-SCNC: 101 MMOL/L — SIGNIFICANT CHANGE UP (ref 96–108)
CO2 SERPL-SCNC: 23 MMOL/L — SIGNIFICANT CHANGE UP (ref 22–31)
CREAT SERPL-MCNC: 1.04 MG/DL — SIGNIFICANT CHANGE UP (ref 0.5–1.3)
GLUCOSE SERPL-MCNC: 105 MG/DL — HIGH (ref 70–99)
HCT VFR BLD CALC: 27.5 % — LOW (ref 34.5–45)
HCT VFR BLD CALC: 28.4 % — LOW (ref 34.5–45)
HGB BLD-MCNC: 10 G/DL — LOW (ref 11.5–15.5)
HGB BLD-MCNC: 9.5 G/DL — LOW (ref 11.5–15.5)
MAGNESIUM SERPL-MCNC: 2.1 MG/DL — SIGNIFICANT CHANGE UP (ref 1.6–2.6)
MCHC RBC-ENTMCNC: 31.3 PG — SIGNIFICANT CHANGE UP (ref 27–34)
MCHC RBC-ENTMCNC: 32.1 PG — SIGNIFICANT CHANGE UP (ref 27–34)
MCHC RBC-ENTMCNC: 34.4 GM/DL — SIGNIFICANT CHANGE UP (ref 32–36)
MCHC RBC-ENTMCNC: 35.4 GM/DL — SIGNIFICANT CHANGE UP (ref 32–36)
MCV RBC AUTO: 90.7 FL — SIGNIFICANT CHANGE UP (ref 80–100)
MCV RBC AUTO: 90.8 FL — SIGNIFICANT CHANGE UP (ref 80–100)
PLATELET # BLD AUTO: 215 K/UL — SIGNIFICANT CHANGE UP (ref 150–400)
PLATELET # BLD AUTO: 225 K/UL — SIGNIFICANT CHANGE UP (ref 150–400)
POTASSIUM SERPL-MCNC: 4.4 MMOL/L — SIGNIFICANT CHANGE UP (ref 3.5–5.3)
POTASSIUM SERPL-SCNC: 4.4 MMOL/L — SIGNIFICANT CHANGE UP (ref 3.5–5.3)
RBC # BLD: 3.03 M/UL — LOW (ref 3.8–5.2)
RBC # BLD: 3.13 M/UL — LOW (ref 3.8–5.2)
RBC # FLD: 12.1 % — SIGNIFICANT CHANGE UP (ref 10.3–14.5)
RBC # FLD: 12.2 % — SIGNIFICANT CHANGE UP (ref 10.3–14.5)
RH IG SCN BLD-IMP: POSITIVE — SIGNIFICANT CHANGE UP
SODIUM SERPL-SCNC: 135 MMOL/L — SIGNIFICANT CHANGE UP (ref 135–145)
WBC # BLD: 6.7 K/UL — SIGNIFICANT CHANGE UP (ref 3.8–10.5)
WBC # BLD: 7.8 K/UL — SIGNIFICANT CHANGE UP (ref 3.8–10.5)
WBC # FLD AUTO: 6.7 K/UL — SIGNIFICANT CHANGE UP (ref 3.8–10.5)
WBC # FLD AUTO: 7.8 K/UL — SIGNIFICANT CHANGE UP (ref 3.8–10.5)

## 2019-07-12 PROCEDURE — 80048 BASIC METABOLIC PNL TOTAL CA: CPT

## 2019-07-12 PROCEDURE — 82962 GLUCOSE BLOOD TEST: CPT

## 2019-07-12 PROCEDURE — 81003 URINALYSIS AUTO W/O SCOPE: CPT

## 2019-07-12 PROCEDURE — 97116 GAIT TRAINING THERAPY: CPT

## 2019-07-12 PROCEDURE — 70450 CT HEAD/BRAIN W/O DYE: CPT

## 2019-07-12 PROCEDURE — 84100 ASSAY OF PHOSPHORUS: CPT

## 2019-07-12 PROCEDURE — 72192 CT PELVIS W/O DYE: CPT

## 2019-07-12 PROCEDURE — 99285 EMERGENCY DEPT VISIT HI MDM: CPT

## 2019-07-12 PROCEDURE — 73502 X-RAY EXAM HIP UNI 2-3 VIEWS: CPT

## 2019-07-12 PROCEDURE — 86900 BLOOD TYPING SEROLOGIC ABO: CPT

## 2019-07-12 PROCEDURE — 86901 BLOOD TYPING SEROLOGIC RH(D): CPT

## 2019-07-12 PROCEDURE — 83735 ASSAY OF MAGNESIUM: CPT

## 2019-07-12 PROCEDURE — 97162 PT EVAL MOD COMPLEX 30 MIN: CPT

## 2019-07-12 PROCEDURE — 73552 X-RAY EXAM OF FEMUR 2/>: CPT

## 2019-07-12 PROCEDURE — 97530 THERAPEUTIC ACTIVITIES: CPT

## 2019-07-12 PROCEDURE — 85610 PROTHROMBIN TIME: CPT

## 2019-07-12 PROCEDURE — 76377 3D RENDER W/INTRP POSTPROCES: CPT

## 2019-07-12 PROCEDURE — 80053 COMPREHEN METABOLIC PANEL: CPT

## 2019-07-12 PROCEDURE — 85730 THROMBOPLASTIN TIME PARTIAL: CPT

## 2019-07-12 PROCEDURE — 86850 RBC ANTIBODY SCREEN: CPT

## 2019-07-12 PROCEDURE — 85027 COMPLETE CBC AUTOMATED: CPT

## 2019-07-12 RX ORDER — ASPIRIN/CALCIUM CARB/MAGNESIUM 324 MG
1 TABLET ORAL
Qty: 0 | Refills: 0 | DISCHARGE

## 2019-07-12 RX ADMIN — Medication 650 MILLIGRAM(S): at 13:29

## 2019-07-12 RX ADMIN — Medication 650 MILLIGRAM(S): at 14:48

## 2019-07-12 NOTE — DISCHARGE NOTE PROVIDER - NSDCCPCAREPLAN_GEN_ALL_CORE_FT
PRINCIPAL DISCHARGE DIAGNOSIS  Diagnosis: Hamstring strain, right, initial encounter  Assessment and Plan of Treatment: CT ABD revealed stable hematoma   ASA hold times one week   Hemoglobin stable   Follow up with PCP      SECONDARY DISCHARGE DIAGNOSES  Diagnosis: Orthostasis  Assessment and Plan of Treatment: Orthostasis    Diagnosis: Nausea and vomiting  Assessment and Plan of Treatment:

## 2019-07-12 NOTE — DISCHARGE NOTE NURSING/CASE MANAGEMENT/SOCIAL WORK - NSDCDPATPORTLINK_GEN_ALL_CORE
You can access the [facility] Patient Portal, offered by [facility], by registering with the following website: [email or web address]

## 2019-07-12 NOTE — PROGRESS NOTE ADULT - ASSESSMENT
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 denies dizziness, s/p orthostasis, got fluids, now stable BP, has a small palpable hematoma at site of injury in posterior right thigh  . will dc home w home pt, warm compresses hold ASA for a week, f/u w pmd in a week
88f hx HTN and AS s/p recent TAVR in Jan 2019 complicated by descending aortic dissection which has been stable on aspirin now presenting after stumbling while ambulating, found to have R hamstring tear and hematoma and mild acute blood loss anemia.

## 2019-07-12 NOTE — DISCHARGE NOTE PROVIDER - HOSPITAL COURSE
88 F PMH dementia, TAVR 1/2019 c/b aortic dissection, HTN, appendectomy, p/w mechanical fall. Patient was ambulating when she stumbled and almost fell.     Right posterior buttock and thigh pain with movement. Pain started immediately after near fall.    Orthopedics was consulted after CT pelvis demonstrated R hamstring avulsion.     No surgical intervention needed h/h stable     DCP with med rec discussed with Dr Woodward Pt is cleared for DC home with Physical Therapy     Follow up with PCP

## 2019-07-12 NOTE — PROGRESS NOTE ADULT - SUBJECTIVE AND OBJECTIVE BOX
Patient is a 88y old  Female who presents with a chief complaint of mechanical fall, R thigh pain. (2019 15:22)      SUBJECTIVE / OVERNIGHT EVENTS: ptn w no new c/o, ambulating without difficulty, no back/buttock/leg pain    MEDICATIONS  (STANDING):    MEDICATIONS  (PRN):  acetaminophen   Tablet .. 650 milliGRAM(s) Oral every 6 hours PRN Temp greater or equal to 38C (100.4F), Mild Pain (1 - 3), Moderate Pain (4 - 6), Severe Pain (7 - 10)      Vital Signs Last 24 Hrs  T(F): 99 (19 @ 15:33), Max: 99 (19 @ 15:33)  HR: 79 (19 @ 15:33) (79 - 88)  BP: 106/58 (19 @ 15:33) (96/54 - 111/65)  RR: 17 (19 @ 15:33) (16 - 17)  SpO2: 95% (19 @ 15:33) (95% - 96%)  Telemetry:   CAPILLARY BLOOD GLUCOSE        I&O's Summary    2019 07:01  -  2019 16:25  --------------------------------------------------------  IN: 270 mL / OUT: 0 mL / NET: 270 mL        PHYSICAL EXAM:  GENERAL: NAD, well-developed  HEAD:  Atraumatic, Normocephalic  EYES: EOMI, PERRLA, conjunctiva and sclera clear  NECK: Supple, No JVD  CHEST/LUNG: Clear to auscultation bilaterally; No wheeze  HEART: Regular rate and rhythm; No murmurs, rubs, or gallops  ABDOMEN: Soft, Nontender, Nondistended; Bowel sounds present  EXTREMITIES:  2+ Peripheral Pulses, No clubbing, cyanosis, or edema  PSYCH: AAOx3  NEUROLOGY: non-focal  SKIN: No rashes or lesions    LABS:                        10.0   7.8   )-----------( 225      ( 2019 12:30 )             28.4     07-12    135  |  101  |  31<H>  ----------------------------<  105<H>  4.4   |  23  |  1.04    Ca    8.7      2019 02:03  Phos  4.3     -  Mg     2.1         TPro  6.2  /  Alb  3.9  /  TBili  0.5  /  DBili  x   /  AST  15  /  ALT  13  /  AlkPhos  53  07-11    PT/INR - ( 10 Jul 2019 21:32 )   PT: 11.8 sec;   INR: 1.03 ratio         PTT - ( 10 Jul 2019 21:32 )  PTT:36.9 sec      Urinalysis Basic - ( 2019 17:42 )    Color: Light Yellow / Appearance: Clear / S.014 / pH: x  Gluc: x / Ketone: Negative  / Bili: Negative / Urobili: Negative   Blood: x / Protein: Negative / Nitrite: Negative   Leuk Esterase: Negative / RBC: x / WBC x   Sq Epi: x / Non Sq Epi: x / Bacteria: x        RADIOLOGY & ADDITIONAL TESTS:    Imaging Personally Reviewed:    Consultant(s) Notes Reviewed:      Care Discussed with Consultants/Other Providers:
Hospitalist- Go Han MD  Pager: [phone]  If no response or off-hours, page [phone]  -------------------------------------  Patient is a 88y old  Female who presents with a chief complaint of mechanical fall, R thigh pain. (11 Jul 2019 01:37)    Subjective: pt reports she now has some R posterior hip pain, but still able to bear weight and ambulate. Spoke to both patient's son at bedside and her daughter (a pediatrician) over the phone to discuss the case. They were unaware patient had torn hamstring with hematoma. Agreeable to further evaluation and monitoring inpatient. Patient is otherwise asymptomatic- no sob/cough, no fatigue/lightheadedness/BOLTON, no fevers/chills. Daughter states pt has a known history of R adnexal cyst which is monitored as outpatient.     14 point ros otherwise negative.     VITAL SIGNS:  Vital Signs Last 24 Hrs  T(C): 36.6 (11 Jul 2019 09:32), Max: 37.1 (11 Jul 2019 00:23)  T(F): 97.9 (11 Jul 2019 09:32), Max: 98.7 (11 Jul 2019 00:23)  HR: 83 (11 Jul 2019 09:32) (83 - 95)  BP: 114/73 (11 Jul 2019 09:32) (100/63 - 166/100)  BP(mean): --  RR: 16 (11 Jul 2019 09:32) (16 - 18)  SpO2: 96% (11 Jul 2019 09:32) (95% - 98%)      PHYSICAL EXAM:     GENERAL: no acute distress  HEENT: PERRLA, EOMI, moist oropharynx   RESPIRATORY: CTAB, no w/c   CARDIOVASCULAR: RRR, no murmurs, gallops, rubs  ABDOMINAL: soft, non-tender, non-distended, positive bowel sounds   EXTREMITIES: no clubbing, cyanosis, or edema. +posterior R LE no ecchymoses, no obvious swelling/induration, no tenderness to palpation.  VASC: +2 bilateral PT pulses, both feet cool to touch, R > L, but no loss of sensation, good motor movements.  NEUROLOGICAL: alert and oriented x 2 (self, place), non-focal  SKIN: no rashes or lesions   MUSCULOSKELETAL: no gross joint deformity  PSYCH: irritable                          9.8    8.43  )-----------( 231      ( 11 Jul 2019 09:36 )             30.8     07-11    134<L>  |  99  |  30<H>  ----------------------------<  128<H>  4.3   |  23  |  0.91    Ca    9.1      11 Jul 2019 07:16  Phos  4.3     07-11  Mg     2.2     07-11    TPro  6.2  /  Alb  3.9  /  TBili  0.5  /  DBili  x   /  AST  15  /  ALT  13  /  AlkPhos  53  07-11      CAPILLARY BLOOD GLUCOSE      POCT Blood Glucose.: 160 mg/dL (11 Jul 2019 00:30)      MEDICATIONS  (STANDING):    MEDICATIONS  (PRN):  acetaminophen   Tablet .. 650 milliGRAM(s) Oral every 6 hours PRN Temp greater or equal to 38C (100.4F), Mild Pain (1 - 3), Moderate Pain (4 - 6), Severe Pain (7 - 10)    < from: CT 3D Reconstruct w/ Workstation (07.11.19 @ 02:19) >  IMPRESSION: Hematoma inferior to the right ischial tuberosity within and   about the right proximal hamstring musculotendinous units with suspected   associated tearing of the right hamstring tendons. MRI can be performed   to further evaluate the status of the tendons and degree of retraction if   indicated.    8.0 x 6.7 x 6.8 cm cystic mass in the right adnexa. Ultrasound is   recommended to further evaluate.    < end of copied text >

## 2019-07-12 NOTE — DISCHARGE NOTE PROVIDER - CARE PROVIDER_API CALL
Gerson Lacey)  Cardiovascular Disease; Internal Medicine  [address] [place] [address]  Phone: [phone]  Fax: [phone]  Follow Up Time:

## 2019-07-18 ENCOUNTER — EMERGENCY (EMERGENCY)
Facility: HOSPITAL | Age: 84
LOS: 1 days | Discharge: HOME | End: 2019-07-18
Attending: EMERGENCY MEDICINE
Payer: MEDICARE

## 2019-07-18 VITALS
SYSTOLIC BLOOD PRESSURE: 170 MMHG | DIASTOLIC BLOOD PRESSURE: 80 MMHG | OXYGEN SATURATION: 100 % | TEMPERATURE: 98 F | HEART RATE: 80 BPM | RESPIRATION RATE: 19 BRPM

## 2019-07-18 VITALS
TEMPERATURE: 99 F | WEIGHT: 106.04 LBS | RESPIRATION RATE: 18 BRPM | SYSTOLIC BLOOD PRESSURE: 189 MMHG | OXYGEN SATURATION: 100 % | DIASTOLIC BLOOD PRESSURE: 89 MMHG | HEIGHT: 60 IN | HEART RATE: 84 BPM

## 2019-07-18 DIAGNOSIS — Z90.49 ACQUIRED ABSENCE OF OTHER SPECIFIED PARTS OF DIGESTIVE TRACT: Chronic | ICD-10-CM

## 2019-07-18 LAB
HCT VFR BLD CALC: 30.7 % — LOW (ref 34.5–45)
HGB BLD-MCNC: 10.3 G/DL — LOW (ref 11.5–15.5)
MCHC RBC-ENTMCNC: 31 PG — SIGNIFICANT CHANGE UP (ref 27–34)
MCHC RBC-ENTMCNC: 33.6 GM/DL — SIGNIFICANT CHANGE UP (ref 32–36)
MCV RBC AUTO: 92.1 FL — SIGNIFICANT CHANGE UP (ref 80–100)
PLATELET # BLD AUTO: 392 K/UL — SIGNIFICANT CHANGE UP (ref 150–400)
RBC # BLD: 3.34 M/UL — LOW (ref 3.8–5.2)
RBC # FLD: 13.1 % — SIGNIFICANT CHANGE UP (ref 10.3–14.5)
WBC # BLD: 6.8 K/UL — SIGNIFICANT CHANGE UP (ref 3.8–10.5)
WBC # FLD AUTO: 6.8 K/UL — SIGNIFICANT CHANGE UP (ref 3.8–10.5)

## 2019-07-18 PROCEDURE — 85027 COMPLETE CBC AUTOMATED: CPT

## 2019-07-18 PROCEDURE — 99284 EMERGENCY DEPT VISIT MOD MDM: CPT | Mod: 25

## 2019-07-18 PROCEDURE — 73630 X-RAY EXAM OF FOOT: CPT | Mod: 26,RT

## 2019-07-18 PROCEDURE — 73630 X-RAY EXAM OF FOOT: CPT

## 2019-07-18 PROCEDURE — 73590 X-RAY EXAM OF LOWER LEG: CPT | Mod: 26,RT

## 2019-07-18 PROCEDURE — 93971 EXTREMITY STUDY: CPT | Mod: 26

## 2019-07-18 PROCEDURE — 72170 X-RAY EXAM OF PELVIS: CPT | Mod: 26

## 2019-07-18 PROCEDURE — 73590 X-RAY EXAM OF LOWER LEG: CPT

## 2019-07-18 PROCEDURE — 72170 X-RAY EXAM OF PELVIS: CPT

## 2019-07-18 PROCEDURE — 93971 EXTREMITY STUDY: CPT

## 2019-07-18 PROCEDURE — 73610 X-RAY EXAM OF ANKLE: CPT | Mod: 26,RT

## 2019-07-18 PROCEDURE — 73610 X-RAY EXAM OF ANKLE: CPT

## 2019-07-18 PROCEDURE — 99284 EMERGENCY DEPT VISIT MOD MDM: CPT

## 2019-07-18 NOTE — ED PROVIDER NOTE - OBJECTIVE STATEMENT
87 y/o F with PMHx of HTN, s/p TAVR, poor historian lives alone presents c/o R ankle pain/injury. Pt believes pain began yesterday but unsure, she states she hit her ankle or foot on something yesterday but unable to remember what it was, denies falls, head trauma, or LOC. Pt c/o bruising diffusely to her RLE and pain in the R ankle, none when lying still, worse with movement and weight bearing, no radiation, unable to describe character of pain, no pain meds taken. Pt notes she only takes one medication denies ASA and Plavix, denies AC, did not take her normal daily morning med. Pt denies other pain or trauma, CP, SOB, HA, change in vision, cough, urinary complaints, previous fx or ortho sx.

## 2019-07-18 NOTE — ED ADULT NURSE NOTE - NSIMPLEMENTINTERV_GEN_ALL_ED
Implemented All Fall Risk Interventions:  [place] to call system. Call bell, personal items and telephone within reach. Instruct patient to call for assistance. Room bathroom lighting operational. Non-slip footwear when patient is off stretcher. Physically safe environment: no spills, clutter or unnecessary equipment. Stretcher in lowest position, wheels locked, appropriate side rails in place. Provide visual cue, wrist band, yellow gown, etc. Monitor gait and stability. Monitor for mental status changes and reorient to person, place, and time. Review medications for side effects contributing to fall risk. Reinforce activity limits and safety measures with patient and family.

## 2019-07-18 NOTE — ED PROVIDER NOTE - PHYSICAL EXAMINATION
GEN: Pt in NAD, A&O x3. GCS 15.   EYES: No periorbital ecchymosis, sclera white w/o injection PERRLA, EOMI w/o pain.  HENT: Head NCAT. Nares without deformity, no DC. No auricular tenderness, no ear DC. no Marinelli's sign. No dental trauma. Neck supple FROM. No midline or paraspinal tenderness. Trachea midline.   RESP: No retractions or chest wall deformities, no signs of trauma/bruising. No chest wall tenderness, CTA b/l, no wheezes, rales, or rhonchi.   CARDIAC: RRR clear distinct S1, S2, +3/6 systolic murmur, no gallops or rubs.  ABDOMEN: Abdomen without any obvious deformities, no signs of trauma or bruising. Abdomen soft, non-tender. No CVAT b/l.   VASC: 2+ radial and dorsalis pulses b/l.   MSK: No joint erythema or obvious deformities. Spine without obvious deformity. No midline or paraspinal tenderness. ttp distal R tibia and posterior to medial malleolus, no other bony joint ttp. FROM w/o pain of UE and LE b/l.   NEURO: CN2-12 intact. Normal and equal sensation UE, LE and face b/l. 5/5 strength upper and lower extremity b/l. Pronator drift negative. Normal gait and gross cerebellar functioning.   SKIN: Ecchymosis of RLE inferior to mid thigh. No obvious skin breaks.

## 2019-07-18 NOTE — ED PROVIDER NOTE - ATTENDING CONTRIBUTION TO CARE
Attending MD Valero:   I personally have seen and examined this patient.  Physician assistant note reviewed and agree on plan of care and except where noted.  See below for details.     Seen in FT5R, unaccompanied    88F with PMH/PSH including HTN, s/p TAVR, not on AC recent admission presents to the ED with "discolored leg".  Reports her neighbor, a nurse, saw her leg which was discolored and was concerned and sent her in to the ED.  Patient reports she came via cab.  Reports a few days ago sat, unable to explain clearly, and caused a hematoma of her buttock.  Denies pain.  Reports came in on the advice of the neighbor only for the discoloration.  Denies recent travel, immobilization, surgeries.  Denies chest pain, shortness of breath, palpitations. Denies abdominal pain, nausea, vomiting, diarrhea, blood in stools. Denies dysuria, hematuria, change in urinary habits including frequency, urgency. Denies fevers, chills, dizziness, weakness. Denies hitting head, LOC.  Review of EMR from 7/10/19-7/12/19 reveals R hamstring tear (seen by ortho).  On exam, AAOx3 at present, NAD, head NCAT, PERRL, FROM at neck, no tenderness to midline palpation, no stepoffs along length of spine, lungs CTAB with good inspiratory effort, +S1S2, +murmur, no r/g, abdomen soft with +BS, NT, ND, no CVAT, moving all extremities with 5/5 strength bilateral upper and lower extremities, good and equal  strength bilaterally, +ecchymosis to the RLE extending from underwear line (buttock fold) posterior and inguinal fold anteriorly, extends all the way to toes, R calf mildly edematous compared to L, non tender, +2 DPs; A/P: 88F with ecchymosis to the RLE, suspect from previous injury, i.e. R hamstring tear last week, will obtain XRs LE and US r/o DVT, patient refused further work up, will await

## 2019-07-18 NOTE — ED PROVIDER NOTE - PROGRESS NOTE DETAILS
pt daughter called the ED to discuss pt case, pt was admitted 8 days ago for fall with R buttock hematoma, stable H/H at time of DC, daughter states pt has not fallen since then, does admit the pt has short term memory loss, reason for pt visit today is that pt neighbor noticed ecchymosis on patient's leg and became concerned and urged her to be reevaluated. Pt given pt now has no objective findings or subjective hx that suggest need for CTH and CXR will DC. Will add on CBC to ensure H/H still stable. -Reynaldo Pfeiffer PA-C labs, images, and plan d/w pt. all questions answered. pt feeling well. pt ready and stable for DC. -Reynaldo Pfeiffer PA-C

## 2019-07-18 NOTE — ED PROVIDER NOTE - NSFOLLOWUPINSTRUCTIONS_ED_ALL_ED_FT
1) Follow-up with your primary care provider in 1-2 days.     Bring all your printed labs.    2) Continue to take all medications as prescribed.    3) Rest and drink plenty of fluids. Pain can be managed with Acetaminophen (aka Tylenol) over the counter as directed.    4) Return to the ER for any new or worsening symptoms.

## 2019-07-19 PROBLEM — I10 ESSENTIAL (PRIMARY) HYPERTENSION: Chronic | Status: ACTIVE | Noted: 2019-07-11

## 2019-07-19 PROBLEM — Z95.2 PRESENCE OF PROSTHETIC HEART VALVE: Chronic | Status: ACTIVE | Noted: 2019-07-11

## 2019-08-13 NOTE — PROGRESS NOTE ADULT - PROBLEM SELECTOR PLAN 6
No apparent complications or complaints regarding anesthesia care at this time
- ppx: holding due to hematoma  - diet: dash/tlc  - dispo: pending clinical progress and repeat PT eval

## 2020-03-25 ENCOUNTER — APPOINTMENT (OUTPATIENT)
Dept: INTERNAL MEDICINE | Facility: CLINIC | Age: 85
End: 2020-03-25

## 2020-05-07 ENCOUNTER — APPOINTMENT (OUTPATIENT)
Dept: INTERNAL MEDICINE | Facility: CLINIC | Age: 85
End: 2020-05-07

## 2020-05-14 ENCOUNTER — APPOINTMENT (OUTPATIENT)
Dept: INTERNAL MEDICINE | Facility: CLINIC | Age: 85
End: 2020-05-14

## 2022-09-15 NOTE — PATIENT PROFILE ADULT - VISION (WITH CORRECTIVE LENSES IF THE PATIENT USUALLY WEARS THEM):
Pt d/c. [facility] service arrived through [place] [facility]. Attempted report called by myself the nurse to Isabela Goyal, phone kept ringing nobody picked up.
Normal vision: sees adequately in most situations; can see medication labels, newsprint

## 2023-01-04 NOTE — PHYSICAL THERAPY INITIAL EVALUATION ADULT - PATIENT/FAMILY AGREES WITH PLAN
yes Area L Indication Text: Tumors in this location are included in Area L (trunk and extremities).  Mohs surgery is indicated for larger tumors, or tumors with aggressive histologic features, in these anatomic locations.

## 2023-02-27 ENCOUNTER — TRANSCRIPTION ENCOUNTER (OUTPATIENT)
Age: 88
End: 2023-02-27

## 2023-05-26 NOTE — DISCHARGE NOTE NURSING/CASE MANAGEMENT/SOCIAL WORK - NSDCPEFALRISK_GEN_ALL_CORE
Patient information on fall and injury prevention
[FreeTextEntry1] : Eye itching\par - possibly eye dryness\par - trial of visene\par - opthalmology f/u\par \par HCM\par - f/u labs\par - healthy diet and exercise \par

## 2024-02-07 ENCOUNTER — RESULT REVIEW (OUTPATIENT)
Age: 89
End: 2024-02-07

## 2024-02-07 ENCOUNTER — TRANSCRIPTION ENCOUNTER (OUTPATIENT)
Age: 89
End: 2024-02-07

## 2024-05-05 ENCOUNTER — NON-APPOINTMENT (OUTPATIENT)
Age: 89
End: 2024-05-05

## 2024-05-17 ENCOUNTER — TRANSCRIPTION ENCOUNTER (OUTPATIENT)
Age: 89
End: 2024-05-17

## 2024-05-17 DIAGNOSIS — G89.3 NEOPLASM RELATED PAIN (ACUTE) (CHRONIC): ICD-10-CM

## 2024-05-17 RX ORDER — OXYCODONE 5 MG/1
5 TABLET ORAL EVERY 6 HOURS
Qty: 14 | Refills: 0 | Status: ACTIVE | COMMUNITY
Start: 2024-05-17 | End: 1900-01-01

## 2024-05-17 RX ORDER — GABAPENTIN 100 MG/1
100 CAPSULE ORAL 3 TIMES DAILY
Qty: 21 | Refills: 0 | Status: ACTIVE | COMMUNITY
Start: 2024-05-17 | End: 1900-01-01

## 2024-05-17 RX ORDER — ACETAMINOPHEN 500 MG/1
500 TABLET, COATED ORAL 3 TIMES DAILY
Qty: 42 | Refills: 5 | Status: ACTIVE | COMMUNITY
Start: 2024-05-17 | End: 1900-01-01